# Patient Record
Sex: MALE | Race: WHITE
[De-identification: names, ages, dates, MRNs, and addresses within clinical notes are randomized per-mention and may not be internally consistent; named-entity substitution may affect disease eponyms.]

---

## 2020-04-24 ENCOUNTER — HOSPITAL ENCOUNTER (EMERGENCY)
Dept: HOSPITAL 25 - UCEAST | Age: 30
Discharge: HOME | End: 2020-04-24
Payer: SELF-PAY

## 2020-04-24 VITALS — DIASTOLIC BLOOD PRESSURE: 82 MMHG | SYSTOLIC BLOOD PRESSURE: 129 MMHG

## 2020-04-24 DIAGNOSIS — I88.9: Primary | ICD-10-CM

## 2020-04-24 PROCEDURE — G0463 HOSPITAL OUTPT CLINIC VISIT: HCPCS

## 2020-04-24 PROCEDURE — 99202 OFFICE O/P NEW SF 15 MIN: CPT

## 2020-04-24 NOTE — UC
Complaint Male HPI





- HPI Summary


HPI Summary: 





For 6 weeks Mr. Tabor has been feeling a firm area in his scrotum.  About a 

week ago it got worse and began to hurt.  It hurts worse with touch.  He has no 

discharge.  He is not sexually active.  The pain is constant





- History of Current Complaint


Chief Complaint: [facility]CGU


Stated Complaint: PERSONAL


Hx Obtained From: Patient


Onset/Duration: Gradual Onset, Lasting Weeks


Timing: Constant


Severity Initially: Mild


Severity Currently: Mild


Pain Intensity: 2


Location: Scrotum


Character: Constant Pressure


Aggravating Factor(s): Palpation - Leaving it alone


Associated Signs And Symptoms: Positive: Negative





- Risk Factors


Testicular Torsion: Negative





- Allergies/Home Medications


Allergies/Adverse Reactions: 


 Allergies











Allergy/AdvReac Type Severity Reaction Status Date / Time


 


No Known Allergies Allergy   Verified 04/24/20 17:05











Home Medications: 


 Home Medications





NK [No Home Medications Reported]  05/02/16 [History Confirmed 04/24/20]


diPHENhydraMINE PO* [Benadryl PO 25 MG TAB*] 25 mg PO BEDTIME PRN 04/24/20 [

History Confirmed 04/24/20]











PMH/Surg Hx/FS Hx/Imm Hx


Previously Healthy: Yes


Other History Of: 


   Negative For: Anticoagulant Therapy





- Surgical History


Surgical History: None





- Family History


Known Family History: Positive: Other - states everyone gets prematurely gray





- Social History


Alcohol Use: Weekly


Substance Use Type: None


Smoking Status (MU): Never Smoked Tobacco


Have You Smoked in the Last Year: No





Review of Systems


All Other Systems Reviewed And Are Negative: Yes


Constitutional: Positive: Negative


Skin: Positive: Negative


Genitourinary: Positive: Negative





Physical Exam





- Summary


Physical Exam Summary: 





He is nontoxic in appearance with stable vitals.


Triage Information Reviewed: Yes


Appearance: Well-Appearing, No Pain Distress


Vital Signs: 


 Initial Vital Signs











Temp  98.9 F   04/24/20 17:02


 


Pulse  74   04/24/20 17:02


 


Resp  20   04/24/20 17:02


 


BP  129/82   04/24/20 17:02


 


Pulse Ox  97   04/24/20 17:02











Vital Signs Reviewed: Yes


Male Genital Exam: Positive: Normal Genitalia, Scrotum Tenderness (L) - At the 

base of his posterior scrotum on the left there are several firm areas of less 

than a centimeter by half centimeter


Musculoskeletal Exam: Normal





 Complaint Male Course/Dx





- Course


Course Of Treatment: 





We do not have ultrasound available today over the weekend.  He is considering 

coming back on Monday for an ultrasound.  I recommended if the pain worsens 

that he go to the emergency department for an ultrasound.  This seemed to be 

inflamed nodes and I recommended that we try an antibiotic while he is waiting 

over the weekend.





- Differential Dx/Diagnosis


Provider Diagnosis: 


 Lymphadenitis








Discharge ED





- Sign-Out/Discharge


Documenting (check all that apply): Patient Departure


All imaging exams completed and their final reports reviewed: No Studies





- Discharge Plan


Condition: Stable


Disposition: HOME


Patient Education Materials:  Lymphadenopathy (ED)


Referrals: 


No Primary Care Phys,NOPCP [Primary Care Provider] - 





- Billing Disposition and Condition


Condition: STABLE


Disposition: Home